# Patient Record
(demographics unavailable — no encounter records)

---

## 2025-02-28 NOTE — PHYSICAL EXAM
[General Appearance - Alert] : alert [General Appearance - Well Developed] : interactive [Appearance Of Head] : the head was normocephalic [Evidence Of Head Injury] : threre was no evidence of injury [Sclera] : the sclera and conjunctiva were normal [PERRL With Normal Accommodation] : pupils were equal in size, round, reactive to light, with normal accommodation [Outer Ear] : the ears and nose were normal in appearance [] : no respiratory distress [Abnormal Walk] : normal gait [Nail Clubbing] : no clubbing  or cyanosis of the fingernails [Skin Color & Pigmentation] : normal skin color and pigmentation [Skin Turgor] : normal skin turgor [FreeTextEntry1] : Abrasion to right shin, full ROM

## 2025-02-28 NOTE — DISCUSSION/SUMMARY
[FreeTextEntry1] : Abrasion to right leg.   Cleaned area w/ NS  Bandage applied.    Mother called and notified of the above.   Cooper notified of the above.  Health counseling reviewed.   RTC as needed

## 2025-02-28 NOTE — HISTORY OF PRESENT ILLNESS
[FreeTextEntry6] : Pt in health clinic for injury to right leg. Pt has 1:1 para, she tripped and fell down the stairs, 1/2 of the stair well.  She did not hit her head. Hx of Epilepsy well controlled with no medications.  Pt remembers the event.